# Patient Record
Sex: MALE | Race: BLACK OR AFRICAN AMERICAN | URBAN - METROPOLITAN AREA
[De-identification: names, ages, dates, MRNs, and addresses within clinical notes are randomized per-mention and may not be internally consistent; named-entity substitution may affect disease eponyms.]

---

## 2021-10-27 ENCOUNTER — OFFICE VISIT (OUTPATIENT)
Dept: URBAN - METROPOLITAN AREA CLINIC 40 | Facility: CLINIC | Age: 21
End: 2021-10-27
Payer: COMMERCIAL

## 2021-10-27 ENCOUNTER — WEB ENCOUNTER (OUTPATIENT)
Dept: URBAN - METROPOLITAN AREA CLINIC 40 | Facility: CLINIC | Age: 21
End: 2021-10-27

## 2021-10-27 DIAGNOSIS — R11.10 REGURGITATION: ICD-10-CM

## 2021-10-27 DIAGNOSIS — K21.9 GERD: ICD-10-CM

## 2021-10-27 PROCEDURE — 99203 OFFICE O/P NEW LOW 30 MIN: CPT | Performed by: INTERNAL MEDICINE

## 2021-10-27 RX ORDER — CYPROHEPTADINE HYDROCHLORIDE 4 MG/1
AS DIRECTED TABLET ORAL
Status: ACTIVE | COMMUNITY

## 2021-10-27 RX ORDER — LORATADINE 10 MG
AS DIRECTED TABLET ORAL
Status: ACTIVE | COMMUNITY

## 2021-10-27 RX ORDER — LEVETIRACETAM 250 MG/1
AS DIRECTED TABLET, FILM COATED ORAL
Status: ACTIVE | COMMUNITY

## 2021-10-27 RX ORDER — NAPROXEN 500 MG/1
AS DIRECTED TABLET ORAL
Status: ACTIVE | COMMUNITY

## 2021-10-27 RX ORDER — FAMOTIDINE 40 MG/1
1 TABLET AT BEDTIME TABLET, FILM COATED ORAL ONCE A DAY
Qty: 90 TABLET | Refills: 0 | OUTPATIENT
Start: 2021-10-27

## 2021-10-27 NOTE — HPI-TODAY'S VISIT:
Mr. Solis is a 21-year-old black male seen as a new patient evaluation for reflux and dyspepsia.  Patient is developmentally delayed and so the history is given by his mother who accompanied him to the visit.  Patient's mother states that he he has had reflux for years.  She has noted more recently burping and hiccuping throughout the day.  He does not complain of any abdominal pain but his home nurse reports occasional soreness to palpation in the epigastric area.  There is no nausea or vomiting.  There is no dysphagia.  His mother indicates he does tend to hold food in his mouth for a prolonged period of time before he swallows.  Is moving his bowels daily to every other day.  She is not noted any blood in stool or melena.  He had been on Zantac in the past but has been off the medication since this was removed from the market.  She does report he uses ibuprofen at least once a week for a leg injury.  She does also report dietary discretions with sodas as well as citrus.  He does tend to eat late.  His last meal is around 7 PM and he is in bed by 9 PM.

## 2021-10-27 NOTE — PHYSICAL EXAM CONSTITUTIONAL:
well developed, well nourished , in no acute distress , ambulating without difficulty , follows simple commands but mostly non verbal with developmental delay

## 2021-11-02 ENCOUNTER — OFFICE VISIT (OUTPATIENT)
Dept: URBAN - METROPOLITAN AREA CLINIC 40 | Facility: CLINIC | Age: 21
End: 2021-11-02

## 2021-11-23 ENCOUNTER — OFFICE VISIT (OUTPATIENT)
Dept: URBAN - METROPOLITAN AREA CLINIC 40 | Facility: CLINIC | Age: 21
End: 2021-11-23
Payer: COMMERCIAL

## 2021-11-23 ENCOUNTER — WEB ENCOUNTER (OUTPATIENT)
Dept: URBAN - METROPOLITAN AREA CLINIC 40 | Facility: CLINIC | Age: 21
End: 2021-11-23

## 2021-11-23 DIAGNOSIS — R11.10 REGURGITATION: ICD-10-CM

## 2021-11-23 DIAGNOSIS — K21.9 GERD: ICD-10-CM

## 2021-11-23 PROBLEM — 235595009 GASTROESOPHAGEAL REFLUX DISEASE: Status: ACTIVE | Noted: 2021-10-27

## 2021-11-23 PROCEDURE — 99213 OFFICE O/P EST LOW 20 MIN: CPT | Performed by: INTERNAL MEDICINE

## 2021-11-23 RX ORDER — CYPROHEPTADINE HYDROCHLORIDE 4 MG/1
AS DIRECTED TABLET ORAL
Status: ACTIVE | COMMUNITY

## 2021-11-23 RX ORDER — LORATADINE 10 MG
AS DIRECTED TABLET ORAL
Status: ACTIVE | COMMUNITY

## 2021-11-23 RX ORDER — LEVETIRACETAM 250 MG/1
AS DIRECTED TABLET, FILM COATED ORAL
Status: ACTIVE | COMMUNITY

## 2021-11-23 RX ORDER — NAPROXEN 500 MG/1
AS DIRECTED TABLET ORAL
Status: ACTIVE | COMMUNITY

## 2021-11-23 RX ORDER — FAMOTIDINE 40 MG/1
1 TABLET AT BEDTIME TABLET, FILM COATED ORAL ONCE A DAY
Qty: 90 TABLET | Refills: 0 | Status: ACTIVE | COMMUNITY
Start: 2021-10-27

## 2021-11-23 RX ORDER — FAMOTIDINE 40 MG/1
1 TABLET AT BEDTIME TABLET, FILM COATED ORAL ONCE A DAY
OUTPATIENT
Start: 2021-10-27

## 2021-11-23 RX ORDER — PANTOPRAZOLE SODIUM 40 MG/1
1 TABLET TABLET, DELAYED RELEASE ORAL ONCE A DAY
Qty: 90 | Refills: 0 | OUTPATIENT
Start: 2021-11-23

## 2021-11-23 NOTE — HPI-TODAY'S VISIT:
Mr. Solis presents to clinic accompanied by his mother.  Recall he is developmentally delayed gentleman who has issues with regurgitation and reflux.  His mother states that she has cut out the ibuprofen and has watched his diet better.  She states that he is no longer having the growling noise from his stomach and she has not noted regurgitation.  He is  still having frequent burping.  She does feel like some of this may be him that eating his food properly as he tends to pack his mouth with food when he eats.

## 2022-01-04 ENCOUNTER — OFFICE VISIT (OUTPATIENT)
Dept: URBAN - METROPOLITAN AREA TELEHEALTH 2 | Facility: TELEHEALTH | Age: 22
End: 2022-01-04
Payer: COMMERCIAL

## 2022-01-04 ENCOUNTER — LAB OUTSIDE AN ENCOUNTER (OUTPATIENT)
Dept: URBAN - METROPOLITAN AREA TELEHEALTH 2 | Facility: TELEHEALTH | Age: 22
End: 2022-01-04

## 2022-01-04 DIAGNOSIS — R10.13 EPIGASTRIC PAIN: ICD-10-CM

## 2022-01-04 DIAGNOSIS — R11.10 REGURGITATION: ICD-10-CM

## 2022-01-04 PROCEDURE — 99214 OFFICE O/P EST MOD 30 MIN: CPT | Performed by: INTERNAL MEDICINE

## 2022-01-04 RX ORDER — PANTOPRAZOLE SODIUM 40 MG/1
1 TABLET TABLET, DELAYED RELEASE ORAL ONCE A DAY
Qty: 90 | Refills: 0
Start: 2021-11-23

## 2022-01-04 RX ORDER — NAPROXEN 500 MG/1
AS DIRECTED TABLET ORAL
Status: ACTIVE | COMMUNITY

## 2022-01-04 RX ORDER — LORATADINE 10 MG
AS DIRECTED TABLET ORAL
Status: ACTIVE | COMMUNITY

## 2022-01-04 RX ORDER — PANTOPRAZOLE SODIUM 40 MG/1
1 TABLET TABLET, DELAYED RELEASE ORAL ONCE A DAY
Qty: 90 | Refills: 0 | Status: ACTIVE | COMMUNITY
Start: 2021-11-23

## 2022-01-04 RX ORDER — LEVETIRACETAM 250 MG/1
AS DIRECTED TABLET, FILM COATED ORAL
Status: ACTIVE | COMMUNITY

## 2022-01-04 RX ORDER — CYPROHEPTADINE HYDROCHLORIDE 4 MG/1
AS DIRECTED TABLET ORAL
Status: ACTIVE | COMMUNITY

## 2022-01-04 NOTE — HPI-TODAY'S VISIT:
Mr. Solis is seen via telehealth video chat for follow-up.  Due to his developmental delay history is received from his mother.  He was seen in November where we changed him from famotidine to pantoprazole due to issues with regurgitation.  Patient's mother states that the burping he had was improved.  He is doing better in terms of regurgitation.  He is still having issues where he is pouching his food and eating too quickly.  Patient's mother states he recently had a urinary tract infection and was on 2 weeks of Macrobid.  He has seen his primary care physician and also been told his vitamin D was low.  He is moving his bowels regularly.  There is no blood in the stool or black stools.

## 2022-01-04 NOTE — PHYSICAL EXAM CONSTITUTIONAL:
Thin BM pleasant, well nourished, well developed, in no acute distress , impaired communication due to developmental delay

## 2022-02-04 ENCOUNTER — CLAIMS CREATED FROM THE CLAIM WINDOW (OUTPATIENT)
Dept: URBAN - METROPOLITAN AREA CLINIC 4 | Facility: CLINIC | Age: 22
End: 2022-02-04
Payer: COMMERCIAL

## 2022-02-04 ENCOUNTER — OFFICE VISIT (OUTPATIENT)
Dept: URBAN - METROPOLITAN AREA SURGERY CENTER 30 | Facility: SURGERY CENTER | Age: 22
End: 2022-02-04
Payer: COMMERCIAL

## 2022-02-04 DIAGNOSIS — K21.9 ACID REFLUX: ICD-10-CM

## 2022-02-04 DIAGNOSIS — R13.19 CERVICAL DYSPHAGIA: ICD-10-CM

## 2022-02-04 DIAGNOSIS — K31.7 POLYP OF STOMACH AND DUODENUM: ICD-10-CM

## 2022-02-04 DIAGNOSIS — K21.9 GASTRO-ESOPHAGEAL REFLUX DISEASE WITHOUT ESOPHAGITIS: ICD-10-CM

## 2022-02-04 DIAGNOSIS — K31.7 BENIGN GASTRIC POLYP: ICD-10-CM

## 2022-02-04 DIAGNOSIS — K31.89 ACQUIRED DEFORMITY OF DUODENUM: ICD-10-CM

## 2022-02-04 DIAGNOSIS — K31.89 STENOSIS OF STOMACH: ICD-10-CM

## 2022-02-04 PROCEDURE — 43450 DILATE ESOPHAGUS 1/MULT PASS: CPT | Performed by: INTERNAL MEDICINE

## 2022-02-04 PROCEDURE — G8907 PT DOC NO EVENTS ON DISCHARG: HCPCS | Performed by: INTERNAL MEDICINE

## 2022-02-04 PROCEDURE — 88305 TISSUE EXAM BY PATHOLOGIST: CPT | Performed by: PATHOLOGY

## 2022-02-04 PROCEDURE — 43239 EGD BIOPSY SINGLE/MULTIPLE: CPT | Performed by: INTERNAL MEDICINE

## 2022-02-04 PROCEDURE — 88312 SPECIAL STAINS GROUP 1: CPT | Performed by: PATHOLOGY

## 2022-02-23 ENCOUNTER — CLAIMS CREATED FROM THE CLAIM WINDOW (OUTPATIENT)
Dept: URBAN - METROPOLITAN AREA CLINIC 40 | Facility: CLINIC | Age: 22
End: 2022-02-23
Payer: COMMERCIAL

## 2022-02-23 DIAGNOSIS — K20.90 ESOPHAGITIS DETERMINED BY ENDOSCOPY: ICD-10-CM

## 2022-02-23 DIAGNOSIS — R11.10 REGURGITATION: ICD-10-CM

## 2022-02-23 PROCEDURE — 99213 OFFICE O/P EST LOW 20 MIN: CPT | Performed by: INTERNAL MEDICINE

## 2022-02-23 RX ORDER — LORATADINE 10 MG
AS DIRECTED TABLET ORAL
Status: ACTIVE | COMMUNITY

## 2022-02-23 RX ORDER — PANTOPRAZOLE SODIUM 40 MG/1
1 TABLET TABLET, DELAYED RELEASE ORAL ONCE A DAY
OUTPATIENT
Start: 2021-11-23

## 2022-02-23 RX ORDER — PANTOPRAZOLE SODIUM 40 MG/1
1 TABLET TABLET, DELAYED RELEASE ORAL ONCE A DAY
Qty: 90 | Refills: 0 | Status: ACTIVE | COMMUNITY
Start: 2021-11-23

## 2022-02-23 RX ORDER — LEVETIRACETAM 250 MG/1
AS DIRECTED TABLET, FILM COATED ORAL
Status: ACTIVE | COMMUNITY

## 2022-02-23 RX ORDER — CYPROHEPTADINE HYDROCHLORIDE 4 MG/1
AS DIRECTED TABLET ORAL
Status: ACTIVE | COMMUNITY

## 2022-02-23 RX ORDER — NAPROXEN 500 MG/1
AS DIRECTED TABLET ORAL
Status: ACTIVE | COMMUNITY

## 2022-02-23 NOTE — HPI-TODAY'S VISIT:
Mr. Solis presents to clinic accompanied by his home health nurse.  He was last seen on January 4 where he was continued to have regurgitation issues despite changed to pantoprazole.  He was referred to speech due to  suspicion for pocketing while he swallows due to his developmental delay.  Speech has evaluated and agrees with this sentiment.  We did get an EGD on February 4 just to ensure no anatomic issues were present.  We did dilate with a 48 [place] dilator.  He was noted to have fundic gland polyps as well as reflux esophagitis and gastritis.  Patient is no longer complaining of chest discomfort.  It is unreliable when he is asked about stomach pain as he usually says yes.  The nurse indicates he is on cyproheptadine for appetite stimulation.  She states when he uses this he maintains his weight.  When he does not he tends to lose weight fairly quickly.

## 2022-05-17 ENCOUNTER — OFFICE VISIT (OUTPATIENT)
Dept: URBAN - METROPOLITAN AREA CLINIC 40 | Facility: CLINIC | Age: 22
End: 2022-05-17
Payer: COMMERCIAL

## 2022-05-17 DIAGNOSIS — R11.10 REGURGITATION: ICD-10-CM

## 2022-05-17 DIAGNOSIS — K20.90 ESOPHAGITIS DETERMINED BY ENDOSCOPY: ICD-10-CM

## 2022-05-17 PROCEDURE — 99214 OFFICE O/P EST MOD 30 MIN: CPT | Performed by: INTERNAL MEDICINE

## 2022-05-17 RX ORDER — LEVETIRACETAM 250 MG/1
AS DIRECTED TABLET, FILM COATED ORAL
Status: ACTIVE | COMMUNITY

## 2022-05-17 RX ORDER — PANTOPRAZOLE SODIUM 40 MG/1
1 TABLET TABLET, DELAYED RELEASE ORAL ONCE A DAY
Status: ACTIVE | COMMUNITY
Start: 2021-11-23

## 2022-05-17 RX ORDER — CYPROHEPTADINE HYDROCHLORIDE 4 MG/1
AS DIRECTED TABLET ORAL
Status: ACTIVE | COMMUNITY

## 2022-05-17 RX ORDER — NAPROXEN 500 MG/1
AS DIRECTED TABLET ORAL
Status: ACTIVE | COMMUNITY

## 2022-05-17 RX ORDER — LORATADINE 10 MG
AS DIRECTED TABLET ORAL
Status: ACTIVE | COMMUNITY

## 2022-05-17 RX ORDER — FAMOTIDINE 40 MG/1
1 TABLET AT BEDTIME TABLET, FILM COATED ORAL ONCE A DAY
Qty: 90 TABLET | Refills: 0 | OUTPATIENT
Start: 2022-05-17

## 2022-05-17 RX ORDER — NAPROXEN 500 MG/1
AS DIRECTED TABLET ORAL
OUTPATIENT

## 2022-05-17 RX ORDER — PANTOPRAZOLE SODIUM 40 MG/1
1 TABLET TABLET, DELAYED RELEASE ORAL ONCE A DAY
Qty: 90 | Refills: 0 | OUTPATIENT
Start: 2021-11-23

## 2022-05-17 NOTE — HPI-TODAY'S VISIT:
Mr. Solis presents to clinic accompanied by his mother.  He was last seen in February.  We are following him for reflux esophagitis and gastritis noted on EGD in February.  Patient's mom states he is doing well.  She still notes occasional belching.  She admits he has had some dietary indiscretions with citrus.  He was working with speech but has not seen them lately.  His mother thinks there is an issue with the insurance and had not heard back from them.  His mother seems to feel like he was doing better on famotidine.  Recall patient was on famotidine in the time he had his EGD repass showed active reflux esophagitis and gastritis.  The time of the EGD was dilated with a 48 [place] dilator.  No reports of trouble swallowing.He still does pack his mouth which speech had been working with him on. Mom states she is watching this closely.

## 2022-07-26 ENCOUNTER — OFFICE VISIT (OUTPATIENT)
Dept: URBAN - METROPOLITAN AREA CLINIC 40 | Facility: CLINIC | Age: 22
End: 2022-07-26
Payer: COMMERCIAL

## 2022-07-26 VITALS
DIASTOLIC BLOOD PRESSURE: 80 MMHG | BODY MASS INDEX: 19.55 KG/M2 | TEMPERATURE: 97.9 F | HEIGHT: 68 IN | WEIGHT: 129 LBS | SYSTOLIC BLOOD PRESSURE: 120 MMHG | HEART RATE: 64 BPM

## 2022-07-26 DIAGNOSIS — R11.10 REGURGITATION: ICD-10-CM

## 2022-07-26 DIAGNOSIS — K20.90 ESOPHAGITIS DETERMINED BY ENDOSCOPY: ICD-10-CM

## 2022-07-26 PROCEDURE — 99213 OFFICE O/P EST LOW 20 MIN: CPT | Performed by: INTERNAL MEDICINE

## 2022-07-26 RX ORDER — LORATADINE 10 MG
AS DIRECTED TABLET ORAL
Status: ACTIVE | COMMUNITY

## 2022-07-26 RX ORDER — PANTOPRAZOLE SODIUM 40 MG/1
1 TABLET TABLET, DELAYED RELEASE ORAL ONCE A DAY
OUTPATIENT
Start: 2021-11-23

## 2022-07-26 RX ORDER — FAMOTIDINE 40 MG/1
1 TABLET AT BEDTIME TABLET, FILM COATED ORAL ONCE A DAY
Qty: 90 TABLET | Refills: 0 | Status: ACTIVE | COMMUNITY
Start: 2022-05-17

## 2022-07-26 RX ORDER — CYPROHEPTADINE HYDROCHLORIDE 4 MG/1
AS DIRECTED TABLET ORAL
Status: DISCONTINUED | COMMUNITY

## 2022-07-26 RX ORDER — PANTOPRAZOLE SODIUM 40 MG/1
1 TABLET TABLET, DELAYED RELEASE ORAL ONCE A DAY
Qty: 90 | Refills: 0 | Status: ACTIVE | COMMUNITY
Start: 2021-11-23

## 2022-07-26 RX ORDER — FAMOTIDINE 40 MG/1
1 TABLET AT BEDTIME TABLET, FILM COATED ORAL ONCE A DAY
OUTPATIENT
Start: 2022-05-17

## 2022-07-26 RX ORDER — LEVETIRACETAM 250 MG/1
AS DIRECTED TABLET, FILM COATED ORAL
Status: ACTIVE | COMMUNITY

## 2022-07-26 NOTE — HPI-TODAY'S VISIT:
Mr. Solis presents to clinic accompanied by his mother.  He was last seen in May.  Patient is being followed by esophagitis noted on EGD in February.  He does have cerebral palsy and has had some issues with regurgitation due to backing of food in his mouth.  His last appointment we encouraged him to stop naproxen that he was taking for his knees, continue pantoprazole and add famotidine at night.  His mother states that that is helped immensely.  He had no issues with reflux since.  She admits she has not gotten back in touch with speech therapy because he in the interim was hurt his knee.  He is back on naproxen sparingly.  There is no report of nausea or dysphagia.

## 2023-01-25 ENCOUNTER — OFFICE VISIT (OUTPATIENT)
Dept: URBAN - METROPOLITAN AREA CLINIC 40 | Facility: CLINIC | Age: 23
End: 2023-01-25
Payer: COMMERCIAL

## 2023-01-25 VITALS — DIASTOLIC BLOOD PRESSURE: 80 MMHG | HEIGHT: 68 IN | SYSTOLIC BLOOD PRESSURE: 130 MMHG | HEART RATE: 94 BPM

## 2023-01-25 DIAGNOSIS — R14.0 ABDOMINAL DISTENSION (GASEOUS): ICD-10-CM

## 2023-01-25 DIAGNOSIS — K20.90 ESOPHAGITIS DETERMINED BY ENDOSCOPY: ICD-10-CM

## 2023-01-25 PROCEDURE — 99214 OFFICE O/P EST MOD 30 MIN: CPT | Performed by: INTERNAL MEDICINE

## 2023-01-25 RX ORDER — PANTOPRAZOLE SODIUM 40 MG/1
1 TABLET TABLET, DELAYED RELEASE ORAL ONCE A DAY
Qty: 90 TABLET | Refills: 1

## 2023-01-25 RX ORDER — LORATADINE 10 MG
AS DIRECTED TABLET ORAL
Status: ACTIVE | COMMUNITY

## 2023-01-25 RX ORDER — LEVETIRACETAM 250 MG/1
AS DIRECTED TABLET, FILM COATED ORAL
Status: ACTIVE | COMMUNITY

## 2023-01-25 RX ORDER — FAMOTIDINE 40 MG/1
1 TABLET AT BEDTIME TABLET, FILM COATED ORAL ONCE A DAY
Status: ACTIVE | COMMUNITY
Start: 2022-05-17

## 2023-01-25 RX ORDER — FAMOTIDINE 40 MG/1
1 TABLET AT BEDTIME TABLET, FILM COATED ORAL ONCE A DAY
Qty: 90 TABLET | Refills: 1

## 2023-01-25 RX ORDER — PANTOPRAZOLE SODIUM 40 MG/1
1 TABLET TABLET, DELAYED RELEASE ORAL ONCE A DAY
Status: ACTIVE | COMMUNITY
Start: 2021-11-23

## 2023-01-25 NOTE — HPI-TODAY'S VISIT:
Mr. Solis presents to clinic accompanied by his mother.  Presents for follow-up of reflux.  He was last seen in July.  Recall patient had an EGD in February of last year which was significant for reflux esophagitis, gastritis and fundic gland polyps.  He has been managed on a combination of pantoprazole and famotidine.  Patient's mother states he has been doing well.  No regurgitation or abdominal pain.  No nausea or vomiting.  He has had recent surgery on his right lower extremity.  She is not giving him NSAIDs and rather controlling his pain with Tylenol.  She does want his abdominal wall looked at.  She is seeing some asymmetry in terms of his abdominal wall.

## 2023-01-25 NOTE — PHYSICAL EXAM GASTROINTESTINAL
Abdomen , soft, nontender, nondistended , no guarding or rigidity , WHSS across periumbilical area with incisional hernia , normal bowel sounds , Liver and Spleen , no hepatomegaly present , no hepatosplenomegaly , liver nontender , spleen not palpable

## 2023-01-25 NOTE — PHYSICAL EXAM CONSTITUTIONAL:
well developed, well nourished , in no acute distress , ambulating without difficulty , patient with devlopmental delay so mostly non verbal

## 2023-05-03 ENCOUNTER — TELEPHONE ENCOUNTER (OUTPATIENT)
Dept: URBAN - METROPOLITAN AREA CLINIC 40 | Facility: CLINIC | Age: 23
End: 2023-05-03

## 2023-05-22 ENCOUNTER — TELEPHONE ENCOUNTER (OUTPATIENT)
Dept: URBAN - METROPOLITAN AREA CLINIC 40 | Facility: CLINIC | Age: 23
End: 2023-05-22

## 2023-06-07 ENCOUNTER — OFFICE VISIT (OUTPATIENT)
Dept: URBAN - METROPOLITAN AREA CLINIC 40 | Facility: CLINIC | Age: 23
End: 2023-06-07
Payer: COMMERCIAL

## 2023-06-07 ENCOUNTER — LAB OUTSIDE AN ENCOUNTER (OUTPATIENT)
Dept: URBAN - METROPOLITAN AREA CLINIC 40 | Facility: CLINIC | Age: 23
End: 2023-06-07

## 2023-06-07 VITALS
BODY MASS INDEX: 19.55 KG/M2 | SYSTOLIC BLOOD PRESSURE: 128 MMHG | DIASTOLIC BLOOD PRESSURE: 74 MMHG | HEART RATE: 78 BPM | WEIGHT: 129 LBS | TEMPERATURE: 97.5 F | HEIGHT: 68 IN

## 2023-06-07 DIAGNOSIS — R19.4 CHANGE IN BOWEL HABIT: ICD-10-CM

## 2023-06-07 DIAGNOSIS — K20.90 ESOPHAGITIS DETERMINED BY ENDOSCOPY: ICD-10-CM

## 2023-06-07 DIAGNOSIS — R93.3 ABNORMAL FINDINGS ON DIAGNOSTIC IMAGING OF OTHER PARTS OF DIGESTIVE TRACT: ICD-10-CM

## 2023-06-07 PROCEDURE — 99214 OFFICE O/P EST MOD 30 MIN: CPT | Performed by: INTERNAL MEDICINE

## 2023-06-07 RX ORDER — PANTOPRAZOLE SODIUM 40 MG/1
1 TABLET TABLET, DELAYED RELEASE ORAL ONCE A DAY
Qty: 90 TABLET | Refills: 1

## 2023-06-07 RX ORDER — LORATADINE 10 MG
AS DIRECTED TABLET ORAL
Status: ACTIVE | COMMUNITY

## 2023-06-07 RX ORDER — LEVETIRACETAM 250 MG/1
AS DIRECTED TABLET, FILM COATED ORAL
Status: ACTIVE | COMMUNITY

## 2023-06-07 RX ORDER — FAMOTIDINE 40 MG/1
1 TABLET AT BEDTIME TABLET, FILM COATED ORAL ONCE A DAY
Qty: 90 TABLET | Refills: 1

## 2023-06-07 RX ORDER — PANTOPRAZOLE SODIUM 40 MG/1
1 TABLET TABLET, DELAYED RELEASE ORAL ONCE A DAY
Qty: 90 TABLET | Refills: 1 | Status: ACTIVE | COMMUNITY

## 2023-06-07 RX ORDER — FAMOTIDINE 40 MG/1
1 TABLET AT BEDTIME TABLET, FILM COATED ORAL ONCE A DAY
Qty: 90 TABLET | Refills: 1 | Status: ACTIVE | COMMUNITY

## 2023-06-07 NOTE — HPI-TODAY'S VISIT:
Mr. Solis presents clinic accompanied by his grandmother.  He was last seen in January.  Patient has a history of esophagitis on EGD last year being managed with pantoprazole and famotidine.  His last appointment his grandmother indicated that there was some asymmetry to his abdomen.  I offered her a CT scan at that time which she declined.  He ended up seeing urology.  They got a CT that was noncontrasted.  This showed surgical sutures in the small bowel as well as dilated bowel in that area.  Question of obstruction as well as question of gallstones versus surgical clips in his right upper quadrant.  Radiology indicated that a CT with oral contrast would better evaluate the small bowel findings.  Patient grandmother states the as a baby had bowel resection.  He is also had a cholecystectomy.  His grandmother admits that he is not been getting the MiraLAX on a routine basis.  He has some bowel irregularity with that.  Reflux appears to be under control although he still has issues with packing food in his mouth and eating too fast.  We had sent him to speech for this.  Apparently the speech therapist advised they needed for approval for further treatment from Medicaid and they never heard back.

## 2023-07-25 ENCOUNTER — OFFICE VISIT (OUTPATIENT)
Dept: URBAN - METROPOLITAN AREA CLINIC 40 | Facility: CLINIC | Age: 23
End: 2023-07-25
Payer: COMMERCIAL

## 2023-07-25 VITALS
SYSTOLIC BLOOD PRESSURE: 120 MMHG | DIASTOLIC BLOOD PRESSURE: 80 MMHG | WEIGHT: 128 LBS | BODY MASS INDEX: 19.4 KG/M2 | HEIGHT: 68 IN | HEART RATE: 66 BPM

## 2023-07-25 DIAGNOSIS — R19.4 CHANGE IN BOWEL HABIT: ICD-10-CM

## 2023-07-25 DIAGNOSIS — R93.3 ABNORMAL FINDINGS ON DIAGNOSTIC IMAGING OF OTHER PARTS OF DIGESTIVE TRACT: ICD-10-CM

## 2023-07-25 DIAGNOSIS — K20.90 ESOPHAGITIS DETERMINED BY ENDOSCOPY: ICD-10-CM

## 2023-07-25 PROCEDURE — 99214 OFFICE O/P EST MOD 30 MIN: CPT | Performed by: INTERNAL MEDICINE

## 2023-07-25 RX ORDER — PANTOPRAZOLE SODIUM 40 MG/1
1 TABLET TABLET, DELAYED RELEASE ORAL ONCE A DAY
Qty: 90 TABLET | Refills: 1 | Status: ACTIVE | COMMUNITY

## 2023-07-25 RX ORDER — LEVETIRACETAM 250 MG/1
AS DIRECTED TABLET, FILM COATED ORAL
Status: ACTIVE | COMMUNITY

## 2023-07-25 RX ORDER — LORATADINE 10 MG
AS DIRECTED TABLET ORAL
Status: ACTIVE | COMMUNITY

## 2023-07-25 RX ORDER — PANTOPRAZOLE SODIUM 40 MG/1
1 TABLET TABLET, DELAYED RELEASE ORAL ONCE A DAY
OUTPATIENT

## 2023-07-25 RX ORDER — FAMOTIDINE 40 MG/1
1 TABLET AT BEDTIME TABLET, FILM COATED ORAL ONCE A DAY
Qty: 90 TABLET | Refills: 1 | Status: ACTIVE | COMMUNITY

## 2023-07-25 RX ORDER — FAMOTIDINE 40 MG/1
1 TABLET AT BEDTIME TABLET, FILM COATED ORAL ONCE A DAY
OUTPATIENT

## 2023-07-25 NOTE — HPI-TODAY'S VISIT:
Mr. Solis presents to clinic accompanied by his caregiver.  He was last seen in June where a noncontrasted CT indicated some bowel dilation around his prior surgical anastomosis.  An order was sent for a CT with contrast.  This was not done.  The caregiver states this is scheduled next month.  He is supposed to be taking MiraLAX on a daily basis due to issues with irregular bowels.  The caregiver does not think he is taking it daily.  Esophagitis that he is being followed for is still well controlled with pantoprazole and famotidine combination.  There is been no further dysphagia since his EGD in February 2022 where he was dilated with a 48 Norwegian dilator.

## 2023-08-01 ENCOUNTER — OFFICE VISIT (OUTPATIENT)
Dept: URBAN - METROPOLITAN AREA CLINIC 40 | Facility: CLINIC | Age: 23
End: 2023-08-01

## 2023-08-17 ENCOUNTER — OFFICE VISIT (OUTPATIENT)
Dept: URBAN - METROPOLITAN AREA CLINIC 40 | Facility: CLINIC | Age: 23
End: 2023-08-17
Payer: COMMERCIAL

## 2023-08-17 VITALS
WEIGHT: 127 LBS | HEART RATE: 74 BPM | BODY MASS INDEX: 19.25 KG/M2 | DIASTOLIC BLOOD PRESSURE: 70 MMHG | SYSTOLIC BLOOD PRESSURE: 118 MMHG | HEIGHT: 68 IN

## 2023-08-17 DIAGNOSIS — K59.01 CONSTIPATION: ICD-10-CM

## 2023-08-17 DIAGNOSIS — R93.3 ABNORMAL FINDINGS ON DIAGNOSTIC IMAGING OF OTHER PARTS OF DIGESTIVE TRACT: ICD-10-CM

## 2023-08-17 PROCEDURE — 99214 OFFICE O/P EST MOD 30 MIN: CPT | Performed by: INTERNAL MEDICINE

## 2023-08-17 RX ORDER — LEVETIRACETAM 250 MG/1
AS DIRECTED TABLET, FILM COATED ORAL
Status: ACTIVE | COMMUNITY
Start: 2023-08-17

## 2023-08-17 RX ORDER — FAMOTIDINE 40 MG/1
1 TABLET AT BEDTIME TABLET, FILM COATED ORAL ONCE A DAY
Status: ACTIVE | COMMUNITY

## 2023-08-17 RX ORDER — PANTOPRAZOLE SODIUM 40 MG/1
1 TABLET TABLET, DELAYED RELEASE ORAL ONCE A DAY
Status: ACTIVE | COMMUNITY

## 2023-08-17 RX ORDER — LORATADINE 10 MG
AS DIRECTED TABLET ORAL
Status: ACTIVE | COMMUNITY

## 2023-08-17 NOTE — HPI-TODAY'S VISIT:
Mr. Slois presents to clinic accompanied by his grandmother.  He was last seen on July 25.  Recall patient had had a noncontrasted CT that showed some possible dilatation of the small bowel at prior anastomosis.  We repeated a CT scan with oral contrast on August 8.  This showed that there was mild prominence of his anastomosis of the small bowel but there was no obstruction.  Bigger issue was the large amount of stool that was present in his entire colon all the way to the rectum.  Mild splenomegaly and mild bladder wall thickening was also noted.  Patient's grandmother states she has been making sure he takes MiraLAX daily.  Reflux is still under good control with pantoprazole and famotidine.

## 2023-08-23 ENCOUNTER — OFFICE VISIT (OUTPATIENT)
Dept: URBAN - METROPOLITAN AREA CLINIC 40 | Facility: CLINIC | Age: 23
End: 2023-08-23

## 2023-09-01 ENCOUNTER — TELEPHONE ENCOUNTER (OUTPATIENT)
Dept: URBAN - METROPOLITAN AREA CLINIC 39 | Facility: CLINIC | Age: 23
End: 2023-09-01

## 2023-09-01 RX ORDER — LINACLOTIDE 72 UG/1
1 CAPSULE AT LEAST 30 MINUTES BEFORE THE FIRST MEAL OF THE DAY ON AN EMPTY STOMACH CAPSULE, GELATIN COATED ORAL ONCE A DAY
Qty: 90 | Refills: 1 | OUTPATIENT
Start: 2023-09-01 | End: 2024-02-27

## 2023-09-07 ENCOUNTER — OFFICE VISIT (OUTPATIENT)
Dept: URBAN - METROPOLITAN AREA CLINIC 40 | Facility: CLINIC | Age: 23
End: 2023-09-07

## 2023-09-28 ENCOUNTER — OFFICE VISIT (OUTPATIENT)
Dept: URBAN - METROPOLITAN AREA CLINIC 40 | Facility: CLINIC | Age: 23
End: 2023-09-28

## 2023-09-28 RX ORDER — FAMOTIDINE 40 MG/1
1 TABLET AT BEDTIME TABLET, FILM COATED ORAL ONCE A DAY
Status: ACTIVE | COMMUNITY

## 2023-09-28 RX ORDER — LINACLOTIDE 72 UG/1
1 CAPSULE AT LEAST 30 MINUTES BEFORE THE FIRST MEAL OF THE DAY ON AN EMPTY STOMACH CAPSULE, GELATIN COATED ORAL ONCE A DAY
Qty: 90 | Refills: 1 | Status: ACTIVE | COMMUNITY
Start: 2023-09-01 | End: 2024-02-27

## 2023-09-28 RX ORDER — LORATADINE 10 MG
AS DIRECTED TABLET ORAL
Status: ACTIVE | COMMUNITY

## 2023-09-28 RX ORDER — PANTOPRAZOLE SODIUM 40 MG/1
1 TABLET TABLET, DELAYED RELEASE ORAL ONCE A DAY
Status: ACTIVE | COMMUNITY

## 2023-09-28 RX ORDER — LEVETIRACETAM 250 MG/1
AS DIRECTED TABLET, FILM COATED ORAL
Status: ACTIVE | COMMUNITY
Start: 2023-08-17

## 2023-10-24 ENCOUNTER — OFFICE VISIT (OUTPATIENT)
Dept: URBAN - METROPOLITAN AREA CLINIC 40 | Facility: CLINIC | Age: 23
End: 2023-10-24
Payer: COMMERCIAL

## 2023-10-24 VITALS
HEIGHT: 68 IN | SYSTOLIC BLOOD PRESSURE: 118 MMHG | BODY MASS INDEX: 19.85 KG/M2 | HEART RATE: 70 BPM | DIASTOLIC BLOOD PRESSURE: 80 MMHG | WEIGHT: 131 LBS

## 2023-10-24 DIAGNOSIS — K59.09 CHRONIC CONSTIPATION: ICD-10-CM

## 2023-10-24 DIAGNOSIS — K21.9 GERD: ICD-10-CM

## 2023-10-24 PROCEDURE — 99214 OFFICE O/P EST MOD 30 MIN: CPT | Performed by: INTERNAL MEDICINE

## 2023-10-24 RX ORDER — PANTOPRAZOLE SODIUM 40 MG/1
1 TABLET TABLET, DELAYED RELEASE ORAL ONCE A DAY
Status: ACTIVE | COMMUNITY

## 2023-10-24 RX ORDER — FAMOTIDINE 40 MG/1
1 TABLET AT BEDTIME TABLET, FILM COATED ORAL ONCE A DAY
OUTPATIENT

## 2023-10-24 RX ORDER — LEVETIRACETAM 500 MG/1
AS DIRECTED TABLET, FILM COATED ORAL
Status: ACTIVE | COMMUNITY
Start: 2023-08-17

## 2023-10-24 RX ORDER — LORATADINE 10 MG
AS DIRECTED TABLET ORAL
Status: ACTIVE | COMMUNITY

## 2023-10-24 RX ORDER — PANTOPRAZOLE SODIUM 40 MG/1
1 TABLET TABLET, DELAYED RELEASE ORAL ONCE A DAY
OUTPATIENT

## 2023-10-24 RX ORDER — LINACLOTIDE 72 UG/1
1 CAPSULE AT LEAST 30 MINUTES BEFORE THE FIRST MEAL OF THE DAY ON AN EMPTY STOMACH CAPSULE, GELATIN COATED ORAL ONCE A DAY
Qty: 90 | Refills: 1 | Status: ACTIVE | COMMUNITY
Start: 2023-09-01 | End: 2024-02-27

## 2023-10-24 RX ORDER — LINACLOTIDE 72 UG/1
1 CAPSULE AT LEAST 30 MINUTES BEFORE THE FIRST MEAL OF THE DAY ON AN EMPTY STOMACH CAPSULE, GELATIN COATED ORAL ONCE A DAY
OUTPATIENT
Start: 2023-09-01

## 2023-10-24 RX ORDER — FAMOTIDINE 40 MG/1
1 TABLET AT BEDTIME TABLET, FILM COATED ORAL ONCE A DAY
Status: ACTIVE | COMMUNITY

## 2023-10-24 NOTE — HPI-TODAY'S VISIT:
Mr. Solis presents to clinic accompanied by his .  Recall he was last seen in August.  We have been following him for reflux managed with pantoprazole and famotidine.  At his last appointment we gave samples of Linzess 72 145 mcg.  We were informed that 72 mcg worked for him.   presents saying that she does not believe that he is getting the Linzess every single day.  She thinks his grandmother is concerned about the amount of medications he is taking.  His Keppra was recently increased.  She states before today his last bowel movement was Friday.  She states that his grandmother has tried to institute more fruit and vegetables in his diet.  She thinks his grandmother may be concerned about him taking the Linzess and then going to his day program and having an accident.  He has been using MiraLAX over the last few days to facilitate the bowel movement he had this morning.

## 2024-01-23 ENCOUNTER — OFFICE VISIT (OUTPATIENT)
Dept: URBAN - METROPOLITAN AREA CLINIC 40 | Facility: CLINIC | Age: 24
End: 2024-01-23

## 2024-01-29 ENCOUNTER — ERX REFILL RESPONSE (OUTPATIENT)
Dept: URBAN - METROPOLITAN AREA CLINIC 40 | Facility: CLINIC | Age: 24
End: 2024-01-29

## 2024-01-29 RX ORDER — LINACLOTIDE 72 UG/1
1 CAPSULE AT LEAST 30 MINUTES BEFORE THE FIRST MEAL OF THE DAY ON AN EMPTY STOMACH CAPSULE, GELATIN COATED ORAL ONCE A DAY
Qty: 30 | Refills: 0 | OUTPATIENT

## 2024-01-29 RX ORDER — FAMOTIDINE 40 MG/1
1 TABLET AT BEDTIME TABLET, FILM COATED ORAL ONCE A DAY
Qty: 30 TABLET | Refills: 0 | OUTPATIENT

## 2024-01-29 RX ORDER — PANTOPRAZOLE SODIUM 40 MG/1
1 TABLET TABLET, DELAYED RELEASE ORAL ONCE A DAY
Qty: 30 TABLET | Refills: 0 | OUTPATIENT

## 2024-01-29 RX ORDER — LINACLOTIDE 72 UG/1
1 CAPSULE AT LEAST 30 MINUTES BEFORE THE FIRST MEAL OF THE DAY ON AN EMPTY STOMACH CAPSULE, GELATIN COATED ORAL ONCE A DAY
OUTPATIENT

## 2024-01-29 RX ORDER — FAMOTIDINE 40 MG/1
1 TABLET AT BEDTIME TABLET, FILM COATED ORAL ONCE A DAY
OUTPATIENT

## 2024-01-29 RX ORDER — PANTOPRAZOLE SODIUM 40 MG/1
1 TABLET TABLET, DELAYED RELEASE ORAL ONCE A DAY
OUTPATIENT

## 2024-02-07 ENCOUNTER — OV EP (OUTPATIENT)
Dept: URBAN - METROPOLITAN AREA CLINIC 40 | Facility: CLINIC | Age: 24
End: 2024-02-07
Payer: COMMERCIAL

## 2024-02-07 VITALS
WEIGHT: 128 LBS | SYSTOLIC BLOOD PRESSURE: 110 MMHG | HEART RATE: 60 BPM | BODY MASS INDEX: 19.4 KG/M2 | DIASTOLIC BLOOD PRESSURE: 80 MMHG | HEIGHT: 68 IN

## 2024-02-07 DIAGNOSIS — K59.01 CONSTIPATION: ICD-10-CM

## 2024-02-07 DIAGNOSIS — K20.90 ESOPHAGITIS DETERMINED BY ENDOSCOPY: ICD-10-CM

## 2024-02-07 PROCEDURE — 99214 OFFICE O/P EST MOD 30 MIN: CPT | Performed by: INTERNAL MEDICINE

## 2024-02-07 RX ORDER — LEVETIRACETAM 500 MG/1
AS DIRECTED TABLET, FILM COATED ORAL
Status: ACTIVE | COMMUNITY
Start: 2023-08-17

## 2024-02-07 RX ORDER — PANTOPRAZOLE SODIUM 40 MG/1
1 TABLET TABLET, DELAYED RELEASE ORAL ONCE A DAY
Qty: 90 | Refills: 1

## 2024-02-07 RX ORDER — FAMOTIDINE 40 MG/1
1 TABLET AT BEDTIME TABLET, FILM COATED ORAL ONCE A DAY
Qty: 90 | Refills: 1

## 2024-02-07 RX ORDER — PANTOPRAZOLE SODIUM 40 MG/1
1 TABLET TABLET, DELAYED RELEASE ORAL ONCE A DAY
Qty: 30 TABLET | Refills: 0 | Status: ACTIVE | COMMUNITY

## 2024-02-07 RX ORDER — LINACLOTIDE 72 UG/1
1 CAPSULE AT LEAST 30 MINUTES BEFORE THE FIRST MEAL OF THE DAY ON AN EMPTY STOMACH CAPSULE, GELATIN COATED ORAL ONCE A DAY
OUTPATIENT

## 2024-02-07 RX ORDER — LINACLOTIDE 72 UG/1
1 CAPSULE AT LEAST 30 MINUTES BEFORE THE FIRST MEAL OF THE DAY ON AN EMPTY STOMACH CAPSULE, GELATIN COATED ORAL ONCE A DAY
Qty: 30 | Refills: 0 | Status: ACTIVE | COMMUNITY

## 2024-02-07 RX ORDER — FAMOTIDINE 40 MG/1
1 TABLET AT BEDTIME TABLET, FILM COATED ORAL ONCE A DAY
Qty: 30 TABLET | Refills: 0 | Status: ACTIVE | COMMUNITY

## 2024-02-07 RX ORDER — LORATADINE 10 MG
AS DIRECTED TABLET ORAL
Status: ACTIVE | COMMUNITY

## 2024-02-07 NOTE — HPI-TODAY'S VISIT:
Mr. Solis is a clinic accompanied by his grandmother for follow-up.  He was last seen in October 2023.  At that appointment his caregiver was questioning whether he was receiving the Linzess on a daily basis.  She thought his grandmother may be holding it secondary to try and avoid accidents that his day program.  His grandmother who is here now states she has been giving it to him on a daily basis.  However he has had recent foot surgery and was on narcotics for short period of time.  That is because his constipation to worsen.  She herself went ahead and started to have given the Linzess 2 tablets a day to ensure he is having a bowel movement every other day.  He denies any blood in the stool.  Reflux is still well-controlled with pantoprazole and famotidine.  His grandmother is requesting a referral to speech therapy as he is starting to pack food in his mouth once again.

## 2024-02-07 NOTE — PHYSICAL EXAM CHEST:
no lesions, no deformities, no traumatic injuries, no significant scars are present, chest wall non-tender, no masses present, breathing is unlabored without accessory muscle use,normal breath sounds archana

## 2024-02-21 ENCOUNTER — OV EP (OUTPATIENT)
Dept: URBAN - METROPOLITAN AREA CLINIC 40 | Facility: CLINIC | Age: 24
End: 2024-02-21

## 2024-05-08 ENCOUNTER — DASHBOARD ENCOUNTERS (OUTPATIENT)
Age: 24
End: 2024-05-08

## 2024-05-08 ENCOUNTER — OFFICE VISIT (OUTPATIENT)
Dept: URBAN - METROPOLITAN AREA CLINIC 40 | Facility: CLINIC | Age: 24
End: 2024-05-08
Payer: COMMERCIAL

## 2024-05-08 VITALS
BODY MASS INDEX: 20.07 KG/M2 | HEART RATE: 69 BPM | HEIGHT: 68 IN | WEIGHT: 132.4 LBS | SYSTOLIC BLOOD PRESSURE: 118 MMHG | DIASTOLIC BLOOD PRESSURE: 78 MMHG | TEMPERATURE: 98.2 F

## 2024-05-08 DIAGNOSIS — K20.90 ESOPHAGITIS DETERMINED BY ENDOSCOPY: ICD-10-CM

## 2024-05-08 DIAGNOSIS — K59.00 CONSTIPATION, UNSPECIFIED: ICD-10-CM

## 2024-05-08 DIAGNOSIS — E80.7 DISORDER OF BILIRUBIN METABOLISM, UNSPECIFIED: ICD-10-CM

## 2024-05-08 PROCEDURE — 99214 OFFICE O/P EST MOD 30 MIN: CPT | Performed by: INTERNAL MEDICINE

## 2024-05-08 RX ORDER — LORATADINE 10 MG
AS DIRECTED TABLET ORAL
Status: ACTIVE | COMMUNITY

## 2024-05-08 RX ORDER — PANTOPRAZOLE SODIUM 40 MG/1
1 TABLET TABLET, DELAYED RELEASE ORAL ONCE A DAY
Qty: 90 | Refills: 1

## 2024-05-08 RX ORDER — LEVETIRACETAM 500 MG/1
AS DIRECTED TABLET, FILM COATED ORAL
Status: ACTIVE | COMMUNITY
Start: 2023-08-17

## 2024-05-08 RX ORDER — FAMOTIDINE 40 MG/1
1 TABLET AT BEDTIME TABLET, FILM COATED ORAL ONCE A DAY
Qty: 90 | Refills: 1

## 2024-05-08 RX ORDER — PANTOPRAZOLE SODIUM 40 MG/1
1 TABLET TABLET, DELAYED RELEASE ORAL ONCE A DAY
Qty: 90 | Refills: 1 | Status: ACTIVE | COMMUNITY

## 2024-05-08 RX ORDER — FAMOTIDINE 40 MG/1
1 TABLET AT BEDTIME TABLET, FILM COATED ORAL ONCE A DAY
Qty: 90 | Refills: 1 | Status: ACTIVE | COMMUNITY

## 2024-05-23 ENCOUNTER — TELEPHONE ENCOUNTER (OUTPATIENT)
Dept: URBAN - METROPOLITAN AREA CLINIC 40 | Facility: CLINIC | Age: 24
End: 2024-05-23

## 2024-05-23 RX ORDER — LINACLOTIDE 290 UG/1
1 CAPSULE AT LEAST 30 MINUTES BEFORE THE FIRST MEAL OF THE DAY ON AN EMPTY STOMACH CAPSULE, GELATIN COATED ORAL ONCE A DAY
Qty: 90 | Refills: 1 | OUTPATIENT
Start: 2024-05-23 | End: 2024-11-18

## 2024-06-19 ENCOUNTER — TELEPHONE ENCOUNTER (OUTPATIENT)
Dept: URBAN - METROPOLITAN AREA CLINIC 40 | Facility: CLINIC | Age: 24
End: 2024-06-19

## 2024-11-12 ENCOUNTER — OFFICE VISIT (OUTPATIENT)
Dept: URBAN - METROPOLITAN AREA CLINIC 40 | Facility: CLINIC | Age: 24
End: 2024-11-12
Payer: COMMERCIAL

## 2024-11-12 VITALS
HEIGHT: 68 IN | TEMPERATURE: 98.7 F | DIASTOLIC BLOOD PRESSURE: 74 MMHG | SYSTOLIC BLOOD PRESSURE: 118 MMHG | HEART RATE: 68 BPM | BODY MASS INDEX: 19.7 KG/M2 | WEIGHT: 130 LBS

## 2024-11-12 DIAGNOSIS — K59.00 CONSTIPATION, UNSPECIFIED: ICD-10-CM

## 2024-11-12 DIAGNOSIS — K20.90 ESOPHAGITIS DETERMINED BY ENDOSCOPY: ICD-10-CM

## 2024-11-12 PROCEDURE — 99214 OFFICE O/P EST MOD 30 MIN: CPT | Performed by: INTERNAL MEDICINE

## 2024-11-12 RX ORDER — PANTOPRAZOLE SODIUM 40 MG/1
1 TABLET TABLET, DELAYED RELEASE ORAL ONCE A DAY
Qty: 90 | Refills: 1

## 2024-11-12 RX ORDER — LORATADINE 10 MG
AS DIRECTED TABLET ORAL
Status: ACTIVE | COMMUNITY

## 2024-11-12 RX ORDER — FAMOTIDINE 40 MG/1
1 TABLET AT BEDTIME TABLET, FILM COATED ORAL ONCE A DAY
Qty: 90 | Refills: 1

## 2024-11-12 RX ORDER — LEVETIRACETAM 500 MG/1
AS DIRECTED TABLET, FILM COATED ORAL
Status: ACTIVE | COMMUNITY
Start: 2023-08-17

## 2024-11-12 RX ORDER — LINACLOTIDE 290 UG/1
1 CAPSULE AT LEAST 30 MINUTES BEFORE THE FIRST MEAL OF THE DAY ON AN EMPTY STOMACH CAPSULE, GELATIN COATED ORAL ONCE A DAY
Qty: 90 | Refills: 1
Start: 2024-05-23 | End: 2025-05-11

## 2024-11-12 RX ORDER — PANTOPRAZOLE SODIUM 40 MG/1
1 TABLET TABLET, DELAYED RELEASE ORAL ONCE A DAY
Qty: 90 | Refills: 1 | Status: ACTIVE | COMMUNITY

## 2024-11-12 RX ORDER — FAMOTIDINE 40 MG/1
1 TABLET AT BEDTIME TABLET, FILM COATED ORAL ONCE A DAY
Qty: 90 | Refills: 1 | Status: ACTIVE | COMMUNITY

## 2024-11-12 RX ORDER — LINACLOTIDE 290 UG/1
1 CAPSULE AT LEAST 30 MINUTES BEFORE THE FIRST MEAL OF THE DAY ON AN EMPTY STOMACH CAPSULE, GELATIN COATED ORAL ONCE A DAY
Qty: 90 | Refills: 1 | Status: ACTIVE | COMMUNITY
Start: 2024-05-23 | End: 2024-11-18

## 2024-11-12 NOTE — HPI-TODAY'S VISIT:
Mr. Solis presents to clinic accompanied by his grandmother.  Recall patient has cerebral palsy and some developmental delay.  At his appointment in May he was given samples of Linzess to 90 is the MiraLAX he was taking along with Linzess 145 was not as effective as his grandmother like.  She states she is giving him this 3 times a week with resultant bowel movement 3 times a week.  Patient has a history of esophagitis on EGD in 2022.  He remains on famotidine and pantoprazole without any breakthrough.  He had a modified barium swallow with speech and September of this year that showed that he was pocketing.  Plans are documented for him to have ongoing speech therapy to assist with this.  At his appointment in May as grandmother mentioned an elevated bilirubin.  Fractionated bilirubin was sent which proved that patient has Gilbert's disease with an indirect bilirubin of 2.6

## 2025-05-13 ENCOUNTER — OFFICE VISIT (OUTPATIENT)
Dept: URBAN - METROPOLITAN AREA CLINIC 40 | Facility: CLINIC | Age: 25
End: 2025-05-13
Payer: COMMERCIAL

## 2025-05-13 DIAGNOSIS — K20.90 ESOPHAGITIS DETERMINED BY ENDOSCOPY: ICD-10-CM

## 2025-05-13 DIAGNOSIS — E80.7 DISORDER OF BILIRUBIN METABOLISM, UNSPECIFIED: ICD-10-CM

## 2025-05-13 DIAGNOSIS — K59.09 CONSTIPATION: ICD-10-CM

## 2025-05-13 PROCEDURE — 99214 OFFICE O/P EST MOD 30 MIN: CPT | Performed by: INTERNAL MEDICINE

## 2025-05-13 RX ORDER — PANTOPRAZOLE SODIUM 40 MG/1
1 TABLET TABLET, DELAYED RELEASE ORAL ONCE A DAY
Qty: 90 | Refills: 1 | Status: ACTIVE | COMMUNITY

## 2025-05-13 RX ORDER — LEVETIRACETAM 500 MG/1
AS DIRECTED TABLET, FILM COATED ORAL
Status: ACTIVE | COMMUNITY
Start: 2023-08-17

## 2025-05-13 RX ORDER — LORATADINE 10 MG
AS DIRECTED TABLET ORAL
Status: ACTIVE | COMMUNITY

## 2025-05-13 RX ORDER — FAMOTIDINE 40 MG/1
1 TABLET AT BEDTIME TABLET, FILM COATED ORAL ONCE A DAY
Qty: 90 | Refills: 1 | Status: ACTIVE | COMMUNITY

## 2025-05-13 RX ORDER — PANTOPRAZOLE SODIUM 40 MG/1
1 TABLET TABLET, DELAYED RELEASE ORAL ONCE A DAY
Qty: 90 | Refills: 1

## 2025-05-13 RX ORDER — LINACLOTIDE 290 UG/1
1 CAPSULE AT LEAST 30 MINUTES BEFORE THE FIRST MEAL OF THE DAY ON AN EMPTY STOMACH CAPSULE, GELATIN COATED ORAL ONCE A DAY
Qty: 90 | Refills: 1
Start: 2024-05-23 | End: 2025-11-09

## 2025-05-13 RX ORDER — FAMOTIDINE 40 MG/1
1 TABLET AT BEDTIME TABLET, FILM COATED ORAL ONCE A DAY
Qty: 90 | Refills: 1

## 2025-05-13 NOTE — HPI-TODAY'S VISIT:
Mr. Solis presents to clinic accompanied by his grandmother.  Patient was last seen in November.  We are following him for chronic constipation as well as a history of esophagitis.  Patient's grandmother states he has been doing well.  He is still using pantoprazole and famotidine.  States occasionally still sees him pocketing food.  Unfortunately Medicaid will not pay for continued speech therapy to assist with that.  She states she still giving him the Linzess 3 times a week which resulted in bowel movement 3 times a week.  She brings up that his primary care physician got labs and it showed an elevated bilirubin.  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  He has not had any nausea,had not been having had any abdominal pain or jaundice.

## 2025-07-08 ENCOUNTER — ERX REFILL RESPONSE (OUTPATIENT)
Dept: URBAN - METROPOLITAN AREA CLINIC 40 | Facility: CLINIC | Age: 25
End: 2025-07-08

## 2025-07-08 RX ORDER — FAMOTIDINE 40 MG/1
1 TABLET AT BEDTIME ORALLY ONCE A DAY 90 DAYS (11/15) TABLET, FILM COATED ORAL
Qty: 90 TABLET | Refills: 0